# Patient Record
Sex: FEMALE | Race: WHITE
[De-identification: names, ages, dates, MRNs, and addresses within clinical notes are randomized per-mention and may not be internally consistent; named-entity substitution may affect disease eponyms.]

---

## 2018-05-15 ENCOUNTER — HOSPITAL ENCOUNTER (OUTPATIENT)
Dept: HOSPITAL 47 - RADCTMAIN | Age: 48
Discharge: HOME | End: 2018-05-15
Payer: MEDICARE

## 2018-05-15 DIAGNOSIS — G93.89: ICD-10-CM

## 2018-05-15 DIAGNOSIS — Z88.8: ICD-10-CM

## 2018-05-15 DIAGNOSIS — E04.1: Primary | ICD-10-CM

## 2018-05-15 LAB — BUN SERPL-SCNC: 21 MG/DL (ref 7–17)

## 2018-05-15 PROCEDURE — 84520 ASSAY OF UREA NITROGEN: CPT

## 2018-05-15 PROCEDURE — 70498 CT ANGIOGRAPHY NECK: CPT

## 2018-05-15 PROCEDURE — 82565 ASSAY OF CREATININE: CPT

## 2018-05-15 PROCEDURE — 70496 CT ANGIOGRAPHY HEAD: CPT

## 2018-05-15 PROCEDURE — 36415 COLL VENOUS BLD VENIPUNCTURE: CPT

## 2018-05-16 NOTE — CT
EXAMINATION TYPE: CT angio head neck

 

DATE OF EXAM: 5/15/2018

 

HISTORY: Vision changes, left eye blindness. History of multiple sclerosis.

 

COMPARISON: NONE

 

CT DLP: 1386.8 mGycm.  Automated Exposure Control for Dose Reduction was Utilized.

 

TECHNIQUE:  CTA scan of the head and neck are performed without and with IV Contrast, patient injecte
d with 65 mL of Isovue 370, axial images are obtained, coronal and sagittal reformatted images are re
viewed. Three-D reconstructed images are created on an independent workstation and reviewed.

 

FINDINGS:

 

Carotid/Vascular Structures: No significant plaque in aortic arch. There is normal three-vessel origi
n from aorta. Bilateral subclavian artery show no significant plaque or stenosis.

 

Right common carotid artery shows normal origin from right brachiocephalic artery. There is no signif
icant plaque or stenosis in right common or internal carotid arteries including at level of carotid b
ulb. There is patent external carotid artery without significant plaque or stenosis. There is mild ca
lcified plaque supraclinoid segment.

 

There is mild eccentric plaque at left carotid bulb extending into proximal internal carotid artery w
ithout significant stenosis. There is mild calcified plaque supraclinoid segment distal left internal
 carotid artery. Remainder of left common and internal carotid artery shows no significant plaque or 
stenosis. There is patent left external carotid artery without significant plaque or stenosis.

 

There is codominant vertebral basilar system. Vertebral arteries are patent to basilar junction. Ther
e are hypoplastic posterior communicating arteries bilaterally. There is no significant stenosis or a
neurysmal change.

 

Images of the anterior circulation show no significant focal stenosis or aneurysmal change. Patent an
terior communicating artery is identified on the raw data.

 

Other: Noncontrast CT shows no acute intracranial hemorrhage or midline shift. Ventricles and sulci a
re within normal limits in size. Suspicious areas of low-attenuation in the deep and periventricular 
white matter are noted, for reference 1.1 cm right frontal lesion axial image 30. The globes are inta
ct and the visualized sinuses are clear.

 

There is mild to moderate spurring and disc space narrowing C5-C6 and C6-C7 levels. There are scatter
ed thyroid nodules including greater than 1 cm right-sided thyroid nodule axial image 32.

 

IMPRESSION:

1. No aneurysmal change at the level of the [place].

2. No significant focal stenosis or aneurysmal change at the level of the [place].

3. Hypodense areas within brain parenchyma likely reflect sequela of known demyelinating disease.

4. Thyroid nodularity, advise thyroid ultrasound follow-up to further evaluate and characterize if th
is is not known finding.

## 2021-05-04 ENCOUNTER — HOSPITAL ENCOUNTER (OUTPATIENT)
Dept: HOSPITAL 47 - LABWHC1 | Age: 51
Discharge: HOME | End: 2021-05-04
Attending: NURSE PRACTITIONER
Payer: MEDICARE

## 2021-05-04 DIAGNOSIS — G35: Primary | ICD-10-CM

## 2021-05-04 LAB
BASOPHILS # BLD AUTO: 0.05 X 10*3/UL (ref 0–0.1)
BASOPHILS NFR BLD AUTO: 0.6 %
EOSINOPHIL # BLD AUTO: 0.36 X 10*3/UL (ref 0.04–0.35)
EOSINOPHIL NFR BLD AUTO: 4.6 %
ERYTHROCYTE [DISTWIDTH] IN BLOOD BY AUTOMATED COUNT: 4.32 X 10*6/UL (ref 4.1–5.2)
ERYTHROCYTE [DISTWIDTH] IN BLOOD: 11.9 % (ref 11.5–14.5)
HCT VFR BLD AUTO: 42.4 % (ref 37.2–46.3)
HGB BLD-MCNC: 13.8 G/DL (ref 12–15)
LYMPHOCYTES # SPEC AUTO: 1.43 X 10*3/UL (ref 0.9–5)
LYMPHOCYTES NFR SPEC AUTO: 18.2 %
MCH RBC QN AUTO: 31.9 PG (ref 27–32)
MCHC RBC AUTO-ENTMCNC: 32.5 G/DL (ref 32–37)
MCV RBC AUTO: 98.1 FL (ref 80–97)
MONOCYTES # BLD AUTO: 0.65 X 10*3/UL (ref 0.2–1)
MONOCYTES NFR BLD AUTO: 8.3 %
NEUTROPHILS # BLD AUTO: 5.3 X 10*3/UL (ref 1.8–7.7)
NEUTROPHILS NFR BLD AUTO: 67.5 %
PLATELET # BLD AUTO: 197 X 10*3/UL (ref 140–440)
WBC # BLD AUTO: 7.85 X 10*3/UL (ref 4.5–10)

## 2021-05-04 PROCEDURE — 84481 FREE ASSAY (FT-3): CPT

## 2021-05-04 PROCEDURE — 84207 ASSAY OF VITAMIN B-6: CPT

## 2021-05-04 PROCEDURE — 80053 COMPREHEN METABOLIC PANEL: CPT

## 2021-05-04 PROCEDURE — 84425 ASSAY OF VITAMIN B-1: CPT

## 2021-05-04 PROCEDURE — 82607 VITAMIN B-12: CPT

## 2021-05-04 PROCEDURE — 82306 VITAMIN D 25 HYDROXY: CPT

## 2021-05-04 PROCEDURE — 84439 ASSAY OF FREE THYROXINE: CPT

## 2021-05-04 PROCEDURE — 86787 VARICELLA-ZOSTER ANTIBODY: CPT

## 2021-05-04 PROCEDURE — 82746 ASSAY OF FOLIC ACID SERUM: CPT

## 2021-05-04 PROCEDURE — 36415 COLL VENOUS BLD VENIPUNCTURE: CPT

## 2021-05-04 PROCEDURE — 83036 HEMOGLOBIN GLYCOSYLATED A1C: CPT

## 2021-05-04 PROCEDURE — 87340 HEPATITIS B SURFACE AG IA: CPT

## 2021-05-04 PROCEDURE — 86704 HEP B CORE ANTIBODY TOTAL: CPT

## 2021-05-04 PROCEDURE — 86705 HEP B CORE ANTIBODY IGM: CPT

## 2021-05-04 PROCEDURE — 84443 ASSAY THYROID STIM HORMONE: CPT

## 2021-05-04 PROCEDURE — 86706 HEP B SURFACE ANTIBODY: CPT

## 2021-05-04 PROCEDURE — 85025 COMPLETE CBC W/AUTO DIFF WBC: CPT

## 2021-05-04 PROCEDURE — 84591 ASSAY OF NOS VITAMIN: CPT

## 2021-05-05 LAB
ALBUMIN SERPL-MCNC: 4.1 G/DL (ref 3.8–4.9)
ALBUMIN/GLOB SERPL: 1.58 G/DL (ref 1.6–3.17)
ALP SERPL-CCNC: 74 U/L (ref 41–126)
ALT SERPL-CCNC: 70 U/L (ref 8–44)
ANION GAP SERPL CALC-SCNC: 11.3 MMOL/L (ref 4–12)
AST SERPL-CCNC: 93 U/L (ref 13–35)
BUN SERPL-SCNC: 22 MG/DL (ref 9–27)
BUN/CREAT SERPL: 22 RATIO (ref 12–20)
CALCIUM SPEC-MCNC: 9.6 MG/DL (ref 8.7–10.3)
CHLORIDE SERPL-SCNC: 105 MMOL/L (ref 96–109)
CO2 SERPL-SCNC: 22.7 MMOL/L (ref 21.6–31.8)
GLOBULIN SER CALC-MCNC: 2.6 G/DL (ref 1.6–3.3)
GLUCOSE SERPL-MCNC: 113 MG/DL (ref 70–110)
HBA1C MFR BLD: 6.6 % (ref 4–6)
POTASSIUM SERPL-SCNC: 4.4 MMOL/L (ref 3.5–5.5)
PROT SERPL-MCNC: 6.7 G/DL (ref 6.2–8.2)
SODIUM SERPL-SCNC: 139 MMOL/L (ref 135–145)
T4 FREE SERPL-MCNC: 1.1 NG/DL (ref 0.8–1.8)
VIT B1 BLD-MCNC: 86 UG/L (ref 38–122)
VIT B12 SERPL-MCNC: 1081 PG/ML (ref 200–944)